# Patient Record
(demographics unavailable — no encounter records)

---

## 2024-11-04 NOTE — HISTORY OF PRESENT ILLNESS
[6] : 6 [de-identified] : 11.4.24 New patient here for left knee pain. Patient states he is having a gout flare up.

## 2024-11-04 NOTE — IMAGING
[Left] : left knee [All Views] : anteroposterior, lateral, skyline, and anteroposterior standing [There are no fractures, subluxations or dislocations. No significant abnormalities are seen] : There are no fractures, subluxations or dislocations. No significant abnormalities are seen [de-identified] : Anterior erythema, no obvius collection or fluctuance Pain with flexion NO joint line ttp NO effusion Negative chepe Non-antalgic gait NVID

## 2024-11-04 NOTE — ASSESSMENT
[FreeTextEntry1] : Chronic gout exacerbation with prepatellar bursitis, possible gout , possible infectious etiology. Advised stat MRI rule out underlying abcess or collection. Start Augmentin once he sees his renal doc. Already finbished MDP and feels a bit better. follow up to review